# Patient Record
Sex: MALE | Race: WHITE | NOT HISPANIC OR LATINO | Employment: FULL TIME | URBAN - METROPOLITAN AREA
[De-identification: names, ages, dates, MRNs, and addresses within clinical notes are randomized per-mention and may not be internally consistent; named-entity substitution may affect disease eponyms.]

---

## 2022-02-14 ENCOUNTER — TRANSFERRED RECORDS (OUTPATIENT)
Dept: HEALTH INFORMATION MANAGEMENT | Facility: CLINIC | Age: 37
End: 2022-02-14
Payer: COMMERCIAL

## 2022-02-21 ENCOUNTER — MEDICAL CORRESPONDENCE (OUTPATIENT)
Dept: HEALTH INFORMATION MANAGEMENT | Facility: CLINIC | Age: 37
End: 2022-02-21
Payer: COMMERCIAL

## 2022-03-07 ENCOUNTER — OFFICE VISIT (OUTPATIENT)
Dept: SURGERY | Facility: CLINIC | Age: 37
End: 2022-03-07
Payer: COMMERCIAL

## 2022-03-07 VITALS
DIASTOLIC BLOOD PRESSURE: 60 MMHG | HEIGHT: 72 IN | WEIGHT: 200 LBS | BODY MASS INDEX: 27.09 KG/M2 | SYSTOLIC BLOOD PRESSURE: 110 MMHG

## 2022-03-07 DIAGNOSIS — R10.32 BILATERAL GROIN PAIN: Primary | ICD-10-CM

## 2022-03-07 DIAGNOSIS — R10.31 BILATERAL GROIN PAIN: Primary | ICD-10-CM

## 2022-03-07 PROCEDURE — 99204 OFFICE O/P NEW MOD 45 MIN: CPT | Performed by: SURGERY

## 2022-03-07 RX ORDER — MULTIVIT,TX WITH IRON,MINERALS
200 TABLET, EXTENDED RELEASE ORAL DAILY
COMMUNITY

## 2022-03-07 RX ORDER — MULTIVIT-MIN/IRON/FOLIC ACID/K 18-600-40
CAPSULE ORAL
COMMUNITY

## 2022-03-07 RX ORDER — LEVOTHYROXINE SODIUM 125 UG/1
125 TABLET ORAL DAILY
COMMUNITY

## 2022-03-09 ENCOUNTER — TELEPHONE (OUTPATIENT)
Dept: SURGERY | Facility: CLINIC | Age: 37
End: 2022-03-09
Payer: COMMERCIAL

## 2022-03-09 NOTE — TELEPHONE ENCOUNTER
Patient was seen 3/7/22 BRP re: Sports hernia.   Wants to confirm that is his diagnosis.  Also has questions about recovery time after surgery  And questions about chances of injury healing without surgery    Phone: [phone]  Message ok

## 2022-03-10 NOTE — TELEPHONE ENCOUNTER
"I messaged Dr. Miguel re: patient's questions re: diagnosis.     Per Dr. Miguel:    \"At this time I do not believe that your symptoms are related to \"sports hernia or core muscle injury \".  I believe the majority of your issues are relative to chronic adductor injury and I believe that this would be best evaluated by Dr. Meir White with [facility] orthopedics.  I believe I provided you with his card at the time of your office consultation.  Once you have seen Dr. Moody if he has other concerns regarding the possibility of core muscle issue or instability I would happily see her back in the office.  Let me know if I can be of other assistance\"    Thanks,     BRP     I called patient with the above information.  He verbalizes understanding and has appointment with Dr. White.  He will call if he has further needs or concerns.      "

## 2022-03-17 NOTE — PROGRESS NOTES
[facility]  Surgery Consultation    CONSULTATION REQUESTED BY:  Kleber Oglesby [phone]    HPI: Patient is a 36-year-old gentleman referred by the above-mentioned provider for consultation regarding possible sports or inguinal hernia.  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  He reports an episode where he was performing a split leg motion while playing hockey and felt a pop in the lower abdomen.  He took time off thereafter and then ultimately try to return after approximately 2 months.  During his time off he had felt improvement in his symptoms but upon resumption of playing hockey he reaggravated this injury.  He has continued to have discomfort and pain primarily in the groins bilaterally.  He has been performing physical therapy for this.  He has not noted any real dramatic improvement but remains relatively asymptomatic at rest but is concerned about proceeding back to playing hockey.  He does have a prior history of bilateral hip arthroscopy back in 2020.  He denies any real significant lower abdominal discomfort.  No pain on coughing or sneezing.  No pain on sit ups or crunches.    PMH:   has a past medical history of Graves disease and RA (rheumatoid arthritis) (H).  PSH:    has a past surgical history that includes Bilateral Hip Arthroscopy.  Social History:   reports that he has never smoked. He has never used smokeless tobacco. He reports that he does not drink alcohol and does not use drugs.  Family History:  family history is not on file.  Medications/Allergies: Home medications and allergies reviewed.    ROS:  The 10 point Review of Systems is negative other than noted in the HPI.    Physical Exam:  /60   Ht 1.829 m (6')   Wt 90.7 kg (200 lb)   BMI 27.12 kg/m    GENERAL: Generally appears well.  Psych: Alert and Oriented.  Normal affect  Eyes: Sclera clear  Respiratory:  Lungs clear to ausculation bilaterally with good air excursion  Cardiovascular:  Regular Rate  and Rhythm with no murmurs gallops or rubs, normal peripheral pulses  GI: Abdomen Non Distended Soft Non-Tender  No hernias palpated..  Groin- I examined the patient in both the standing and supine positions. Right Groin- No hernia Palpated. Left Groin- No hernia Palpated. No scrotal or testicle abnormalities.  His area of greatest discomfort appears to be localized in the medial groins bilaterally.  That said there is not any real significant discomfort or pain with forced adduction.  He has no real significant discomfort particularly inguinal areas with leg raise or sit up.  Lymphatic/Hematologic/Immune:  No femoral or cervical lymphadenopathy.  Integumentary:  No rashes  Neurological: grossly intact     All new lab and imaging data was reviewed.     Impression and Plan:  Patient is a 36 year old male with bilateral groin pain    PLAN: I had a lengthy discussion with him regarding inguinal hernia/sports hernia/core muscle injury.  At present I believe his signs and symptoms seem mostly related to bilateral groin tendinopathy.  That said I think he may be a candidate for tendon lengthening surgery.  At present I do not see any indication to proceed with core muscle surgery or inguinal hernia repair.  He was given a referral to Dr. Meir White of [facility] orthopedics for further consideration.  We will await this evaluation.  If there are ongoing questions or concerns I would happily reevaluate him at any time.      Thank you very much for this consult.    Jake Miguel M.D.  [facility]  [phone]    Please route or send letter to:  Primary Care Provider (PCP) and Referring Provider

## 2022-04-10 ENCOUNTER — HEALTH MAINTENANCE LETTER (OUTPATIENT)
Age: 37
End: 2022-04-10

## 2022-10-16 ENCOUNTER — HEALTH MAINTENANCE LETTER (OUTPATIENT)
Age: 37
End: 2022-10-16

## 2023-03-26 ENCOUNTER — HEALTH MAINTENANCE LETTER (OUTPATIENT)
Age: 38
End: 2023-03-26

## 2024-06-01 ENCOUNTER — HEALTH MAINTENANCE LETTER (OUTPATIENT)
Age: 39
End: 2024-06-01